# Patient Record
Sex: MALE | Race: WHITE | NOT HISPANIC OR LATINO
[De-identification: names, ages, dates, MRNs, and addresses within clinical notes are randomized per-mention and may not be internally consistent; named-entity substitution may affect disease eponyms.]

---

## 2018-12-07 PROBLEM — Z00.00 ENCOUNTER FOR PREVENTIVE HEALTH EXAMINATION: Status: ACTIVE | Noted: 2018-12-07

## 2018-12-13 ENCOUNTER — APPOINTMENT (OUTPATIENT)
Dept: NEUROLOGY | Facility: CLINIC | Age: 68
End: 2018-12-13
Payer: MEDICARE

## 2018-12-13 PROCEDURE — 95819 EEG AWAKE AND ASLEEP: CPT

## 2018-12-27 ENCOUNTER — APPOINTMENT (OUTPATIENT)
Dept: NEUROSURGERY | Facility: CLINIC | Age: 68
End: 2018-12-27
Payer: MEDICARE

## 2018-12-27 VITALS
HEIGHT: 69 IN | OXYGEN SATURATION: 95 % | RESPIRATION RATE: 16 BRPM | SYSTOLIC BLOOD PRESSURE: 167 MMHG | TEMPERATURE: 97.9 F | WEIGHT: 185 LBS | DIASTOLIC BLOOD PRESSURE: 95 MMHG | HEART RATE: 67 BPM | BODY MASS INDEX: 27.4 KG/M2

## 2018-12-27 DIAGNOSIS — R41.3 OTHER AMNESIA: ICD-10-CM

## 2018-12-27 DIAGNOSIS — G93.9 DISORDER OF BRAIN, UNSPECIFIED: ICD-10-CM

## 2018-12-27 PROCEDURE — 99205 OFFICE O/P NEW HI 60 MIN: CPT

## 2018-12-27 RX ORDER — TAMSULOSIN HYDROCHLORIDE 0.4 MG/1
0.4 CAPSULE ORAL
Refills: 0 | Status: ACTIVE | COMMUNITY

## 2018-12-27 RX ORDER — FINASTERIDE 5 MG/1
5 TABLET, FILM COATED ORAL
Refills: 0 | Status: ACTIVE | COMMUNITY

## 2018-12-27 RX ORDER — METOPROLOL TARTRATE 75 MG/1
TABLET, FILM COATED ORAL
Refills: 0 | Status: ACTIVE | COMMUNITY

## 2018-12-27 RX ORDER — ASPIRIN 81 MG
81 TABLET,CHEWABLE ORAL
Refills: 0 | Status: ACTIVE | COMMUNITY

## 2018-12-27 RX ORDER — ATORVASTATIN CALCIUM 80 MG/1
TABLET, FILM COATED ORAL
Refills: 0 | Status: ACTIVE | COMMUNITY

## 2019-01-02 ENCOUNTER — OUTPATIENT (OUTPATIENT)
Dept: OUTPATIENT SERVICES | Facility: HOSPITAL | Age: 69
LOS: 1 days | End: 2019-01-02

## 2019-01-02 ENCOUNTER — APPOINTMENT (OUTPATIENT)
Dept: MRI IMAGING | Facility: CLINIC | Age: 69
End: 2019-01-02

## 2021-04-02 ENCOUNTER — INPATIENT (INPATIENT)
Facility: HOSPITAL | Age: 71
LOS: 2 days | Discharge: HOME | DRG: 418 | End: 2021-04-05
Attending: SURGERY | Admitting: SURGERY
Payer: MEDICARE

## 2021-04-02 ENCOUNTER — TRANSCRIPTION ENCOUNTER (OUTPATIENT)
Age: 71
End: 2021-04-02

## 2021-04-02 VITALS
RESPIRATION RATE: 18 BRPM | OXYGEN SATURATION: 97 % | WEIGHT: 199.96 LBS | HEIGHT: 67 IN | TEMPERATURE: 98 F | SYSTOLIC BLOOD PRESSURE: 159 MMHG | HEART RATE: 86 BPM | DIASTOLIC BLOOD PRESSURE: 89 MMHG

## 2021-04-02 DIAGNOSIS — Z98.84 BARIATRIC SURGERY STATUS: Chronic | ICD-10-CM

## 2021-04-02 DIAGNOSIS — Z98.890 OTHER SPECIFIED POSTPROCEDURAL STATES: Chronic | ICD-10-CM

## 2021-04-02 LAB
ALBUMIN SERPL ELPH-MCNC: 4 G/DL — SIGNIFICANT CHANGE UP (ref 3.4–5)
ALP SERPL-CCNC: 80 U/L — SIGNIFICANT CHANGE UP (ref 40–120)
ALT FLD-CCNC: 50 U/L — HIGH (ref 12–42)
ANION GAP SERPL CALC-SCNC: 11 MMOL/L — SIGNIFICANT CHANGE UP (ref 9–16)
APPEARANCE UR: CLEAR — SIGNIFICANT CHANGE UP
APTT BLD: 30.6 SEC — SIGNIFICANT CHANGE UP (ref 27.5–35.5)
AST SERPL-CCNC: 31 U/L — SIGNIFICANT CHANGE UP (ref 15–37)
BACTERIA # UR AUTO: PRESENT /HPF
BASOPHILS # BLD AUTO: 0.04 K/UL — SIGNIFICANT CHANGE UP (ref 0–0.2)
BASOPHILS NFR BLD AUTO: 0.3 % — SIGNIFICANT CHANGE UP (ref 0–2)
BILIRUB SERPL-MCNC: 1.7 MG/DL — HIGH (ref 0.2–1.2)
BILIRUB UR-MCNC: ABNORMAL
BUN SERPL-MCNC: 17 MG/DL — SIGNIFICANT CHANGE UP (ref 7–23)
CALCIUM SERPL-MCNC: 9.3 MG/DL — SIGNIFICANT CHANGE UP (ref 8.5–10.5)
CHLORIDE SERPL-SCNC: 103 MMOL/L — SIGNIFICANT CHANGE UP (ref 96–108)
CO2 SERPL-SCNC: 27 MMOL/L — SIGNIFICANT CHANGE UP (ref 22–31)
COLOR SPEC: YELLOW — SIGNIFICANT CHANGE UP
COMMENT - URINE: SIGNIFICANT CHANGE UP
CREAT SERPL-MCNC: 1.02 MG/DL — SIGNIFICANT CHANGE UP (ref 0.5–1.3)
DIFF PNL FLD: ABNORMAL
EOSINOPHIL # BLD AUTO: 0.2 K/UL — SIGNIFICANT CHANGE UP (ref 0–0.5)
EOSINOPHIL NFR BLD AUTO: 1.3 % — SIGNIFICANT CHANGE UP (ref 0–6)
EPI CELLS # UR: SIGNIFICANT CHANGE UP /HPF (ref 0–5)
GLUCOSE SERPL-MCNC: 174 MG/DL — HIGH (ref 70–99)
GLUCOSE UR QL: NEGATIVE — SIGNIFICANT CHANGE UP
HCT VFR BLD CALC: 45.9 % — SIGNIFICANT CHANGE UP (ref 39–50)
HGB BLD-MCNC: 15.8 G/DL — SIGNIFICANT CHANGE UP (ref 13–17)
HYALINE CASTS # UR AUTO: SIGNIFICANT CHANGE UP /LPF (ref 0–2)
IMM GRANULOCYTES NFR BLD AUTO: 0.6 % — SIGNIFICANT CHANGE UP (ref 0–1.5)
INR BLD: 1.11 — SIGNIFICANT CHANGE UP (ref 0.88–1.16)
KETONES UR-MCNC: >=80 MG/DL
LEUKOCYTE ESTERASE UR-ACNC: NEGATIVE — SIGNIFICANT CHANGE UP
LIDOCAIN IGE QN: 72 U/L — LOW (ref 73–393)
LYMPHOCYTES # BLD AUTO: 0.42 K/UL — LOW (ref 1–3.3)
LYMPHOCYTES # BLD AUTO: 2.7 % — LOW (ref 13–44)
MCHC RBC-ENTMCNC: 31.7 PG — SIGNIFICANT CHANGE UP (ref 27–34)
MCHC RBC-ENTMCNC: 34.4 GM/DL — SIGNIFICANT CHANGE UP (ref 32–36)
MCV RBC AUTO: 92 FL — SIGNIFICANT CHANGE UP (ref 80–100)
MONOCYTES # BLD AUTO: 0.98 K/UL — HIGH (ref 0–0.9)
MONOCYTES NFR BLD AUTO: 6.3 % — SIGNIFICANT CHANGE UP (ref 2–14)
NEUTROPHILS # BLD AUTO: 13.94 K/UL — HIGH (ref 1.8–7.4)
NEUTROPHILS NFR BLD AUTO: 88.8 % — HIGH (ref 43–77)
NITRITE UR-MCNC: NEGATIVE — SIGNIFICANT CHANGE UP
NRBC # BLD: 0 /100 WBCS — SIGNIFICANT CHANGE UP (ref 0–0)
PH UR: 6 — SIGNIFICANT CHANGE UP (ref 5–8)
PLATELET # BLD AUTO: 183 K/UL — SIGNIFICANT CHANGE UP (ref 150–400)
POTASSIUM SERPL-MCNC: 3.8 MMOL/L — SIGNIFICANT CHANGE UP (ref 3.5–5.3)
POTASSIUM SERPL-SCNC: 3.8 MMOL/L — SIGNIFICANT CHANGE UP (ref 3.5–5.3)
PROT SERPL-MCNC: 7.9 G/DL — SIGNIFICANT CHANGE UP (ref 6.4–8.2)
PROT UR-MCNC: NEGATIVE MG/DL — SIGNIFICANT CHANGE UP
PROTHROM AB SERPL-ACNC: 13.2 SEC — SIGNIFICANT CHANGE UP (ref 10.6–13.6)
RBC # BLD: 4.99 M/UL — SIGNIFICANT CHANGE UP (ref 4.2–5.8)
RBC # FLD: 13.2 % — SIGNIFICANT CHANGE UP (ref 10.3–14.5)
RBC CASTS # UR COMP ASSIST: ABNORMAL /HPF
SARS-COV-2 RNA SPEC QL NAA+PROBE: SIGNIFICANT CHANGE UP
SODIUM SERPL-SCNC: 141 MMOL/L — SIGNIFICANT CHANGE UP (ref 132–145)
SP GR SPEC: >=1.03 — SIGNIFICANT CHANGE UP (ref 1–1.03)
UROBILINOGEN FLD QL: 0.2 E.U./DL — SIGNIFICANT CHANGE UP
WBC # BLD: 15.68 K/UL — HIGH (ref 3.8–10.5)
WBC # FLD AUTO: 15.68 K/UL — HIGH (ref 3.8–10.5)
WBC UR QL: < 5 /HPF — SIGNIFICANT CHANGE UP

## 2021-04-02 PROCEDURE — 71045 X-RAY EXAM CHEST 1 VIEW: CPT | Mod: 26

## 2021-04-02 PROCEDURE — 93010 ELECTROCARDIOGRAM REPORT: CPT

## 2021-04-02 PROCEDURE — 76700 US EXAM ABDOM COMPLETE: CPT | Mod: 26

## 2021-04-02 PROCEDURE — 74176 CT ABD & PELVIS W/O CONTRAST: CPT | Mod: 26,MH

## 2021-04-02 PROCEDURE — 99285 EMERGENCY DEPT VISIT HI MDM: CPT | Mod: CS

## 2021-04-02 RX ORDER — SODIUM CHLORIDE 9 MG/ML
1000 INJECTION INTRAMUSCULAR; INTRAVENOUS; SUBCUTANEOUS ONCE
Refills: 0 | Status: COMPLETED | OUTPATIENT
Start: 2021-04-02 | End: 2021-04-02

## 2021-04-02 RX ORDER — HEPARIN SODIUM 5000 [USP'U]/ML
5000 INJECTION INTRAVENOUS; SUBCUTANEOUS EVERY 8 HOURS
Refills: 0 | Status: DISCONTINUED | OUTPATIENT
Start: 2021-04-02 | End: 2021-04-05

## 2021-04-02 RX ORDER — ACETAMINOPHEN 500 MG
1000 TABLET ORAL ONCE
Refills: 0 | Status: COMPLETED | OUTPATIENT
Start: 2021-04-02 | End: 2021-04-03

## 2021-04-02 RX ORDER — MORPHINE SULFATE 50 MG/1
4 CAPSULE, EXTENDED RELEASE ORAL ONCE
Refills: 0 | Status: DISCONTINUED | OUTPATIENT
Start: 2021-04-02 | End: 2021-04-02

## 2021-04-02 RX ORDER — ONDANSETRON 8 MG/1
4 TABLET, FILM COATED ORAL EVERY 6 HOURS
Refills: 0 | Status: DISCONTINUED | OUTPATIENT
Start: 2021-04-02 | End: 2021-04-05

## 2021-04-02 RX ORDER — HYDROMORPHONE HYDROCHLORIDE 2 MG/ML
0.5 INJECTION INTRAMUSCULAR; INTRAVENOUS; SUBCUTANEOUS EVERY 4 HOURS
Refills: 0 | Status: DISCONTINUED | OUTPATIENT
Start: 2021-04-02 | End: 2021-04-03

## 2021-04-02 RX ORDER — ACETAMINOPHEN 500 MG
1000 TABLET ORAL ONCE
Refills: 0 | Status: DISCONTINUED | OUTPATIENT
Start: 2021-04-02 | End: 2021-04-02

## 2021-04-02 RX ORDER — ASPIRIN/CALCIUM CARB/MAGNESIUM 324 MG
81 TABLET ORAL DAILY
Refills: 0 | Status: DISCONTINUED | OUTPATIENT
Start: 2021-04-02 | End: 2021-04-05

## 2021-04-02 RX ORDER — SODIUM CHLORIDE 9 MG/ML
1000 INJECTION, SOLUTION INTRAVENOUS
Refills: 0 | Status: DISCONTINUED | OUTPATIENT
Start: 2021-04-02 | End: 2021-04-03

## 2021-04-02 RX ORDER — ONDANSETRON 8 MG/1
4 TABLET, FILM COATED ORAL ONCE
Refills: 0 | Status: COMPLETED | OUTPATIENT
Start: 2021-04-02 | End: 2021-04-02

## 2021-04-02 RX ORDER — CEFTRIAXONE 500 MG/1
1000 INJECTION, POWDER, FOR SOLUTION INTRAMUSCULAR; INTRAVENOUS ONCE
Refills: 0 | Status: COMPLETED | OUTPATIENT
Start: 2021-04-02 | End: 2021-04-02

## 2021-04-02 RX ORDER — METRONIDAZOLE 500 MG
TABLET ORAL
Refills: 0 | Status: DISCONTINUED | OUTPATIENT
Start: 2021-04-02 | End: 2021-04-05

## 2021-04-02 RX ORDER — CEFTRIAXONE 500 MG/1
1000 INJECTION, POWDER, FOR SOLUTION INTRAMUSCULAR; INTRAVENOUS EVERY 24 HOURS
Refills: 0 | Status: DISCONTINUED | OUTPATIENT
Start: 2021-04-02 | End: 2021-04-05

## 2021-04-02 RX ORDER — FINASTERIDE 5 MG/1
1 TABLET, FILM COATED ORAL
Qty: 0 | Refills: 0 | DISCHARGE

## 2021-04-02 RX ORDER — TAMSULOSIN HYDROCHLORIDE 0.4 MG/1
1 CAPSULE ORAL
Qty: 0 | Refills: 0 | DISCHARGE

## 2021-04-02 RX ORDER — METRONIDAZOLE 500 MG
500 TABLET ORAL ONCE
Refills: 0 | Status: COMPLETED | OUTPATIENT
Start: 2021-04-02 | End: 2021-04-02

## 2021-04-02 RX ORDER — METRONIDAZOLE 500 MG
500 TABLET ORAL EVERY 8 HOURS
Refills: 0 | Status: DISCONTINUED | OUTPATIENT
Start: 2021-04-03 | End: 2021-04-05

## 2021-04-02 RX ORDER — FAMOTIDINE 10 MG/ML
1 INJECTION INTRAVENOUS
Qty: 0 | Refills: 0 | DISCHARGE

## 2021-04-02 RX ORDER — ACETAMINOPHEN 500 MG
975 TABLET ORAL ONCE
Refills: 0 | Status: COMPLETED | OUTPATIENT
Start: 2021-04-02 | End: 2021-04-02

## 2021-04-02 RX ORDER — ATORVASTATIN CALCIUM 80 MG/1
1 TABLET, FILM COATED ORAL
Qty: 0 | Refills: 0 | DISCHARGE

## 2021-04-02 RX ADMIN — Medication 81 MILLIGRAM(S): at 21:48

## 2021-04-02 RX ADMIN — SODIUM CHLORIDE 1000 MILLILITER(S): 9 INJECTION INTRAMUSCULAR; INTRAVENOUS; SUBCUTANEOUS at 14:51

## 2021-04-02 RX ADMIN — MORPHINE SULFATE 4 MILLIGRAM(S): 50 CAPSULE, EXTENDED RELEASE ORAL at 13:04

## 2021-04-02 RX ADMIN — CEFTRIAXONE 100 MILLIGRAM(S): 500 INJECTION, POWDER, FOR SOLUTION INTRAMUSCULAR; INTRAVENOUS at 22:47

## 2021-04-02 RX ADMIN — HEPARIN SODIUM 5000 UNIT(S): 5000 INJECTION INTRAVENOUS; SUBCUTANEOUS at 21:48

## 2021-04-02 RX ADMIN — HYDROMORPHONE HYDROCHLORIDE 0.5 MILLIGRAM(S): 2 INJECTION INTRAMUSCULAR; INTRAVENOUS; SUBCUTANEOUS at 21:48

## 2021-04-02 RX ADMIN — ONDANSETRON 4 MILLIGRAM(S): 8 TABLET, FILM COATED ORAL at 13:04

## 2021-04-02 RX ADMIN — SODIUM CHLORIDE 1000 MILLILITER(S): 9 INJECTION INTRAMUSCULAR; INTRAVENOUS; SUBCUTANEOUS at 15:01

## 2021-04-02 RX ADMIN — CEFTRIAXONE 100 MILLIGRAM(S): 500 INJECTION, POWDER, FOR SOLUTION INTRAMUSCULAR; INTRAVENOUS at 15:01

## 2021-04-02 RX ADMIN — HYDROMORPHONE HYDROCHLORIDE 0.5 MILLIGRAM(S): 2 INJECTION INTRAMUSCULAR; INTRAVENOUS; SUBCUTANEOUS at 22:00

## 2021-04-02 RX ADMIN — Medication 975 MILLIGRAM(S): at 15:37

## 2021-04-02 RX ADMIN — MORPHINE SULFATE 4 MILLIGRAM(S): 50 CAPSULE, EXTENDED RELEASE ORAL at 15:35

## 2021-04-02 RX ADMIN — Medication 100 MILLIGRAM(S): at 21:24

## 2021-04-02 RX ADMIN — Medication 975 MILLIGRAM(S): at 15:02

## 2021-04-02 RX ADMIN — MORPHINE SULFATE 4 MILLIGRAM(S): 50 CAPSULE, EXTENDED RELEASE ORAL at 13:38

## 2021-04-02 RX ADMIN — SODIUM CHLORIDE 130 MILLILITER(S): 9 INJECTION, SOLUTION INTRAVENOUS at 21:48

## 2021-04-02 NOTE — H&P ADULT - NSICDXPASTMEDICALHX_GEN_ALL_CORE_FT
PAST MEDICAL HISTORY:  BPH without urinary obstruction     Chronic stable angina     GERD (gastroesophageal reflux disease)     HLD (hyperlipidemia)

## 2021-04-02 NOTE — ED PROVIDER NOTE - PROGRESS NOTE DETAILS
RUQ work up done, added CT renal stone hunt for hematuria, imaging consistent w cholecystitis., pt spiked a fever while in ED, treated w antipyretics, started first dose of IV abxs. Case discussed w Dr Curtis from Sx service, pt to be admitted to Sx [facility]. Stable for [facility].

## 2021-04-02 NOTE — ED ADULT TRIAGE NOTE - CHIEF COMPLAINT QUOTE
pt. sent from  for further eval of RLQ pain which began about 2 am. Pt. denies any nausea, vomiting or diarrhea. Pt. presents pale in triage.

## 2021-04-02 NOTE — ED PROVIDER NOTE - OBJECTIVE STATEMENT
71 yo male pt, hx of HLD (lipitor), BPH, presents w sudden onset R upper abd pain that started around 2 am this am. Became constant, had one moment of nausea when pain started that since has subsided. Went to  and sent in to ED for abd pain work up. no no chest pain, no vomiting, no fever, no chills, no cough, no HA, no neck pain, no rash.

## 2021-04-02 NOTE — ED PROVIDER NOTE - CLINICAL SUMMARY MEDICAL DECISION MAKING FREE TEXT BOX
Pt with RUQ pain, tender, +silva's, will get labs R/O wagner, pancreatitis, etc. Will provide analgesia and reassess.

## 2021-04-02 NOTE — H&P ADULT - NSHPSOCIALHISTORY_GEN_ALL_CORE
Marital Status:  Single      Recent Travel: No recent travel  Substance Use (street drugs): ( x ) never used    Tobacco Usage:     (  x ) former smoker - stopped 30 years ago  Alcohol Usage: occasional/social

## 2021-04-02 NOTE — H&P ADULT - NSHPLABSRESULTS_GEN_ALL_CORE
LABS:  cret                        15.8   15.68 )-----------( 183      ( 02 Apr 2021 13:11 )             45.9     04-02    141  |  103  |  17  ----------------------------<  174<H>  3.8   |  27  |  1.02    Ca    9.3      02 Apr 2021 13:11    TPro  7.9  /  Alb  4.0  /  TBili  1.7<H>  /  DBili  x   /  AST  31  /  ALT  50<H>  /  AlkPhos  80  04-02    PT/INR - ( 02 Apr 2021 13:12 )   PT: 13.2 sec;   INR: 1.11          PTT - ( 02 Apr 2021 13:12 )  PTT:30.6 sec      CT SCAN:   - Images of the upper abdomen demonstrate no focal hepatic abnormalities. Multiple small gallstones are seen within the gallbladder lumen and gallbladder neck. Gallbladder is mildly distended and there is mild infiltration of the fat surrounding the gallbladder. The pancreas is normal in appearance. No splenic abnormalities are seen.  - Gastric band is in place. Evaluation of bowel is limited without oral contrast. A normal appendix is identified. There is colonic diverticulosis without evidence of acute diverticulitis. There is no bowel obstruction or free air. Surgical clips are noted in the right lower quadrant. No ascites is evident. There is a small fat-containing ventral hernia at the entrance site of gastric band tubing.    IMPRESSION:  No hydronephrosis or nephrolithiasis.  Cholelithiasis with distended gallbladder and mild pericholecystic infiltration, suspicious for acute cholecystitis    US:   Liver: Within normal limits.  Bile ducts: Normal caliber. Common bile duct measures 0.5 cm.  Gallbladder: Multiple small gallstones. No pericholecystic fluid is present. There is gallbladder wall thickening to 6 mm. Sonographic Booth's sign is unable to be assessed secondary to recently administered analgesia.  Pancreas: Visualized portions are within normal limits.  Spleen: Measures 12.1 cm in length. Within normal limits.  Right kidney: Measures 10.8 cm in length. No hydronephrosis.  Left kidney: Measures 12.2 cm in length. No hydronephrosis.  Ascites: None.  Aorta and IVC: Visualized portions are within normal limits.    IMPRESSION:  Multiple small gallstones within a thick-walled gallbladder, which is suspicious for acute cholecystitis.

## 2021-04-02 NOTE — H&P ADULT - ATTENDING COMMENTS
Patient seen and examined in pre-op holding area on 4/3/2021.  Agree with above.    Abdomen soft, mild RUQ tenderness despite pain medication.  Prior scars from lap band and abdominoplasty noted.    Labs, imaging reviewed, consistent with acute cholecystitis.    To OR for robotic-assisted cholecystectomy.

## 2021-04-02 NOTE — H&P ADULT - NSHPREVIEWOFSYSTEMS_GEN_ALL_CORE
Review of Systems: Constitutional: (-) fever, (-) chills  Eyes/ENT: (-) blurry vision, (-) epistaxis, (-) sore throat  Cardiovascular: (-) chest pain, (-) syncope  Respiratory: (-) cough, (-) shortness of breath  Gastrointestinal: (+) abdominal pain, (-) vomiting, (-) diarrhea  Musculoskeletal: (-) neck pain, (-) back pain, (-) joint pain  Integumentary: (-) rash, (-) edema  Neurological: (-) headache, (-) altered mental status  Psychiatric: (-) hallucinations,   Allergic/Immunologic: (-) pruritus

## 2021-04-02 NOTE — H&P ADULT - HISTORY OF PRESENT ILLNESS
Patient is a 69 YO male, with PMH of BPH, HLD, Angina (s/p cardiac stent in 2013), GERD, former smoker. PSH of Lap band for weight loss (2005) and Abdominoplasty in 2007. Patient presents with 18 hours of severe RUQ pain, sudden, non-radiating, sharp, 8/10, worse with movement and improvement with Advil. The pain is not associated with food. The pain last for a short period. Denies Nausea, vomit, fever, chills, change in the color of stool or urine, yellow skin or eyes discoloration. This is the first time this pattern of pain happened. Last BM was 4/1/21. The pain started around 2am and he looked for medical assistance around noon because the pain did not improve. At The ED, he got morphine twice that significantly improved the pain and discomfort.

## 2021-04-02 NOTE — H&P ADULT - NSHPPHYSICALEXAM_GEN_ALL_CORE
.  VITAL SIGNS:      PHYSICAL EXAM:    Constitutional: WDWN resting comfortably in bed; NAD  HEENT: NC/AT, PERRL, EOMI, anicteric sclera, no nasal discharge; uvula midline, no oropharyngeal erythema or exudates; MMM  Neck: supple; no JVD or thyromegaly  Respiratory: unlabored breathing, no retractions or use of accessory muscles   Gastrointestinal: soft, NT, positive silva sign, no guarding  Extremities: WWP, no clubbing or cyanosis; no peripheral edema  Vascular: 2+ radial, DP/PT pulses B/L  Dermatologic: skin warm, dry and intact; no rashes, wounds, or scars, No ictericia   Lymphatic: no submandibular or cervical LAD  Neurologic: AAOx3; no focal deficits  Psychiatric: affect and characteristics of appearance, verbalizations, behaviors are appropriate

## 2021-04-02 NOTE — H&P ADULT - ASSESSMENT
Patient is a 71 YO male, with PMH of BPH, HLD, Angina (s/p cardiac stent in 2013), GERD, former smoker. PSH of Lap band for weight loss (2005) and Abdominoplasty in 2007. Presentes with RUQ pain, With clinical and radiologic diagnosis of acute cholecystitis. With Normal CBD size, normal bilirubin, and alkaline phosphate. US: Cholelithiasis with distended gallbladder and mild pericholecystic infiltration, suspicious for acute cholecystitis. Patient admitted for Cholecystectomy.     NPO after MN  Ceftriaxone and flagyl   AM labs   DVT PPX with SQH  EKG  ECH  IVF  Added to OR for robotic assisted Lap Ya with Dr. Seo

## 2021-04-03 ENCOUNTER — RESULT REVIEW (OUTPATIENT)
Age: 71
End: 2021-04-03

## 2021-04-03 LAB
ALBUMIN SERPL ELPH-MCNC: 3.3 G/DL — SIGNIFICANT CHANGE UP (ref 3.3–5)
ALP SERPL-CCNC: 126 U/L — HIGH (ref 40–120)
ALT FLD-CCNC: 191 U/L — HIGH (ref 10–45)
ANION GAP SERPL CALC-SCNC: 10 MMOL/L — SIGNIFICANT CHANGE UP (ref 5–17)
AST SERPL-CCNC: 148 U/L — HIGH (ref 10–40)
BILIRUB SERPL-MCNC: 4.8 MG/DL — HIGH (ref 0.2–1.2)
BLD GP AB SCN SERPL QL: NEGATIVE — SIGNIFICANT CHANGE UP
BUN SERPL-MCNC: 12 MG/DL — SIGNIFICANT CHANGE UP (ref 7–23)
CALCIUM SERPL-MCNC: 8.8 MG/DL — SIGNIFICANT CHANGE UP (ref 8.4–10.5)
CHLORIDE SERPL-SCNC: 105 MMOL/L — SIGNIFICANT CHANGE UP (ref 96–108)
CO2 SERPL-SCNC: 21 MMOL/L — LOW (ref 22–31)
COVID-19 SPIKE DOMAIN AB INTERP: POSITIVE
COVID-19 SPIKE DOMAIN ANTIBODY RESULT: >250 U/ML — HIGH
CREAT SERPL-MCNC: 0.84 MG/DL — SIGNIFICANT CHANGE UP (ref 0.5–1.3)
GLUCOSE SERPL-MCNC: 113 MG/DL — HIGH (ref 70–99)
HCT VFR BLD CALC: 43 % — SIGNIFICANT CHANGE UP (ref 39–50)
HCV AB S/CO SERPL IA: 0.06 S/CO — SIGNIFICANT CHANGE UP
HCV AB SERPL-IMP: SIGNIFICANT CHANGE UP
HGB BLD-MCNC: 14.5 G/DL — SIGNIFICANT CHANGE UP (ref 13–17)
MAGNESIUM SERPL-MCNC: 1.7 MG/DL — SIGNIFICANT CHANGE UP (ref 1.6–2.6)
MCHC RBC-ENTMCNC: 30.9 PG — SIGNIFICANT CHANGE UP (ref 27–34)
MCHC RBC-ENTMCNC: 33.7 GM/DL — SIGNIFICANT CHANGE UP (ref 32–36)
MCV RBC AUTO: 91.5 FL — SIGNIFICANT CHANGE UP (ref 80–100)
NRBC # BLD: 0 /100 WBCS — SIGNIFICANT CHANGE UP (ref 0–0)
PHOSPHATE SERPL-MCNC: 1.9 MG/DL — LOW (ref 2.5–4.5)
PLATELET # BLD AUTO: 172 K/UL — SIGNIFICANT CHANGE UP (ref 150–400)
POTASSIUM SERPL-MCNC: 3.5 MMOL/L — SIGNIFICANT CHANGE UP (ref 3.5–5.3)
POTASSIUM SERPL-SCNC: 3.5 MMOL/L — SIGNIFICANT CHANGE UP (ref 3.5–5.3)
PROT SERPL-MCNC: 6.4 G/DL — SIGNIFICANT CHANGE UP (ref 6–8.3)
RBC # BLD: 4.7 M/UL — SIGNIFICANT CHANGE UP (ref 4.2–5.8)
RBC # FLD: 13.5 % — SIGNIFICANT CHANGE UP (ref 10.3–14.5)
RH IG SCN BLD-IMP: POSITIVE — SIGNIFICANT CHANGE UP
SARS-COV-2 IGG+IGM SERPL QL IA: >250 U/ML — HIGH
SARS-COV-2 IGG+IGM SERPL QL IA: POSITIVE
SODIUM SERPL-SCNC: 136 MMOL/L — SIGNIFICANT CHANGE UP (ref 135–145)
WBC # BLD: 15.55 K/UL — HIGH (ref 3.8–10.5)
WBC # FLD AUTO: 15.55 K/UL — HIGH (ref 3.8–10.5)

## 2021-04-03 PROCEDURE — 47562 LAPAROSCOPIC CHOLECYSTECTOMY: CPT

## 2021-04-03 PROCEDURE — 88304 TISSUE EXAM BY PATHOLOGIST: CPT | Mod: 26

## 2021-04-03 PROCEDURE — 99223 1ST HOSP IP/OBS HIGH 75: CPT | Mod: 57

## 2021-04-03 RX ORDER — POTASSIUM PHOSPHATE, MONOBASIC POTASSIUM PHOSPHATE, DIBASIC 236; 224 MG/ML; MG/ML
30 INJECTION, SOLUTION INTRAVENOUS ONCE
Refills: 0 | Status: COMPLETED | OUTPATIENT
Start: 2021-04-03 | End: 2021-04-03

## 2021-04-03 RX ORDER — MAGNESIUM SULFATE 500 MG/ML
1 VIAL (ML) INJECTION ONCE
Refills: 0 | Status: COMPLETED | OUTPATIENT
Start: 2021-04-03 | End: 2021-04-03

## 2021-04-03 RX ORDER — METOPROLOL TARTRATE 50 MG
50 TABLET ORAL DAILY
Refills: 0 | Status: DISCONTINUED | OUTPATIENT
Start: 2021-04-03 | End: 2021-04-05

## 2021-04-03 RX ORDER — ACETAMINOPHEN 500 MG
650 TABLET ORAL EVERY 6 HOURS
Refills: 0 | Status: DISCONTINUED | OUTPATIENT
Start: 2021-04-03 | End: 2021-04-05

## 2021-04-03 RX ORDER — BUPIVACAINE 13.3 MG/ML
20 INJECTION, SUSPENSION, LIPOSOMAL INFILTRATION ONCE
Refills: 0 | Status: DISCONTINUED | OUTPATIENT
Start: 2021-04-03 | End: 2021-04-05

## 2021-04-03 RX ORDER — OXYCODONE HYDROCHLORIDE 5 MG/1
10 TABLET ORAL EVERY 6 HOURS
Refills: 0 | Status: DISCONTINUED | OUTPATIENT
Start: 2021-04-03 | End: 2021-04-05

## 2021-04-03 RX ORDER — PANTOPRAZOLE SODIUM 20 MG/1
40 TABLET, DELAYED RELEASE ORAL DAILY
Refills: 0 | Status: DISCONTINUED | OUTPATIENT
Start: 2021-04-03 | End: 2021-04-04

## 2021-04-03 RX ORDER — POTASSIUM CHLORIDE 20 MEQ
40 PACKET (EA) ORAL ONCE
Refills: 0 | Status: COMPLETED | OUTPATIENT
Start: 2021-04-03 | End: 2021-04-03

## 2021-04-03 RX ORDER — PANTOPRAZOLE SODIUM 20 MG/1
40 TABLET, DELAYED RELEASE ORAL ONCE
Refills: 0 | Status: COMPLETED | OUTPATIENT
Start: 2021-04-03 | End: 2021-04-03

## 2021-04-03 RX ORDER — HYDROMORPHONE HYDROCHLORIDE 2 MG/ML
0.5 INJECTION INTRAMUSCULAR; INTRAVENOUS; SUBCUTANEOUS
Refills: 0 | Status: DISCONTINUED | OUTPATIENT
Start: 2021-04-03 | End: 2021-04-05

## 2021-04-03 RX ORDER — OXYCODONE HYDROCHLORIDE 5 MG/1
5 TABLET ORAL EVERY 6 HOURS
Refills: 0 | Status: DISCONTINUED | OUTPATIENT
Start: 2021-04-03 | End: 2021-04-05

## 2021-04-03 RX ADMIN — HEPARIN SODIUM 5000 UNIT(S): 5000 INJECTION INTRAVENOUS; SUBCUTANEOUS at 22:17

## 2021-04-03 RX ADMIN — HEPARIN SODIUM 5000 UNIT(S): 5000 INJECTION INTRAVENOUS; SUBCUTANEOUS at 05:46

## 2021-04-03 RX ADMIN — Medication 100 MILLIGRAM(S): at 05:46

## 2021-04-03 RX ADMIN — Medication 100 GRAM(S): at 17:37

## 2021-04-03 RX ADMIN — Medication 100 MILLIGRAM(S): at 15:53

## 2021-04-03 RX ADMIN — PANTOPRAZOLE SODIUM 40 MILLIGRAM(S): 20 TABLET, DELAYED RELEASE ORAL at 05:46

## 2021-04-03 RX ADMIN — PANTOPRAZOLE SODIUM 40 MILLIGRAM(S): 20 TABLET, DELAYED RELEASE ORAL at 12:02

## 2021-04-03 RX ADMIN — POTASSIUM PHOSPHATE, MONOBASIC POTASSIUM PHOSPHATE, DIBASIC 83.33 MILLIMOLE(S): 236; 224 INJECTION, SOLUTION INTRAVENOUS at 18:30

## 2021-04-03 RX ADMIN — Medication 40 MILLIEQUIVALENT(S): at 15:53

## 2021-04-03 RX ADMIN — Medication 100 MILLIGRAM(S): at 23:24

## 2021-04-03 RX ADMIN — CEFTRIAXONE 100 MILLIGRAM(S): 500 INJECTION, POWDER, FOR SOLUTION INTRAMUSCULAR; INTRAVENOUS at 22:18

## 2021-04-03 RX ADMIN — Medication 1000 MILLIGRAM(S): at 03:45

## 2021-04-03 RX ADMIN — Medication 400 MILLIGRAM(S): at 03:29

## 2021-04-03 RX ADMIN — Medication 81 MILLIGRAM(S): at 15:54

## 2021-04-03 RX ADMIN — HEPARIN SODIUM 5000 UNIT(S): 5000 INJECTION INTRAVENOUS; SUBCUTANEOUS at 15:53

## 2021-04-03 NOTE — BRIEF OPERATIVE NOTE - OPERATION/FINDINGS
Abdomen entered via optiview port placement in LUQ given patient's lap band. 3 additional robotic ports place under direct view. Upon entering the abdomen, bilious fluid encountered in the RUQ which was suctioned. Gallbladder was gangrenous and was drained prior to dissection. GB fundus retracted superiorly over dome of liver. Dense adhesions between the GB & omentum/duo taken down. GB infundibulum retracted laterally towards RLQ exposing Calot’s triangle. Critical view of safety obtained. Cystic duct and artery clipped and divided. Peritoneal attachments btw GB & liver bed . Copious irrigation of the remaining bile in the RUQ and JPx1 placed near the GB fossa. Hemostasis achieved. Robot undocked and ports closed with monocryl.

## 2021-04-03 NOTE — PACU DISCHARGE NOTE - COMMENTS
Verbal report given to YOUNG Holman, v/anthony domingo, pt. to be transferred to room 9616, vpoicing no complaints.

## 2021-04-04 DIAGNOSIS — E78.5 HYPERLIPIDEMIA, UNSPECIFIED: ICD-10-CM

## 2021-04-04 DIAGNOSIS — I25.10 ATHEROSCLEROTIC HEART DISEASE OF NATIVE CORONARY ARTERY WITHOUT ANGINA PECTORIS: ICD-10-CM

## 2021-04-04 DIAGNOSIS — N40.0 BENIGN PROSTATIC HYPERPLASIA WITHOUT LOWER URINARY TRACT SYMPTOMS: ICD-10-CM

## 2021-04-04 DIAGNOSIS — K81.9 CHOLECYSTITIS, UNSPECIFIED: ICD-10-CM

## 2021-04-04 LAB
ALBUMIN SERPL ELPH-MCNC: 3.6 G/DL — SIGNIFICANT CHANGE UP (ref 3.3–5)
ALBUMIN SERPL ELPH-MCNC: 3.7 G/DL — SIGNIFICANT CHANGE UP (ref 3.3–5)
ALP SERPL-CCNC: 107 U/L — SIGNIFICANT CHANGE UP (ref 40–120)
ALP SERPL-CCNC: 109 U/L — SIGNIFICANT CHANGE UP (ref 40–120)
ALT FLD-CCNC: 172 U/L — HIGH (ref 10–45)
ALT FLD-CCNC: 173 U/L — HIGH (ref 10–45)
ANION GAP SERPL CALC-SCNC: 9 MMOL/L — SIGNIFICANT CHANGE UP (ref 5–17)
AST SERPL-CCNC: 106 U/L — HIGH (ref 10–40)
AST SERPL-CCNC: 115 U/L — HIGH (ref 10–40)
BASOPHILS # BLD AUTO: 0.01 K/UL — SIGNIFICANT CHANGE UP (ref 0–0.2)
BASOPHILS NFR BLD AUTO: 0.1 % — SIGNIFICANT CHANGE UP (ref 0–2)
BILIRUB DIRECT SERPL-MCNC: 0.6 MG/DL — HIGH (ref 0–0.2)
BILIRUB INDIRECT FLD-MCNC: 1 MG/DL — SIGNIFICANT CHANGE UP (ref 0.2–1)
BILIRUB SERPL-MCNC: 1.6 MG/DL — HIGH (ref 0.2–1.2)
BILIRUB SERPL-MCNC: 1.7 MG/DL — HIGH (ref 0.2–1.2)
BUN SERPL-MCNC: 13 MG/DL — SIGNIFICANT CHANGE UP (ref 7–23)
CALCIUM SERPL-MCNC: 9 MG/DL — SIGNIFICANT CHANGE UP (ref 8.4–10.5)
CHLORIDE SERPL-SCNC: 109 MMOL/L — HIGH (ref 96–108)
CO2 SERPL-SCNC: 23 MMOL/L — SIGNIFICANT CHANGE UP (ref 22–31)
CREAT SERPL-MCNC: 0.85 MG/DL — SIGNIFICANT CHANGE UP (ref 0.5–1.3)
EOSINOPHIL # BLD AUTO: 0 K/UL — SIGNIFICANT CHANGE UP (ref 0–0.5)
EOSINOPHIL NFR BLD AUTO: 0 % — SIGNIFICANT CHANGE UP (ref 0–6)
GLUCOSE SERPL-MCNC: 115 MG/DL — HIGH (ref 70–99)
HCT VFR BLD CALC: 41.3 % — SIGNIFICANT CHANGE UP (ref 39–50)
HGB BLD-MCNC: 13.7 G/DL — SIGNIFICANT CHANGE UP (ref 13–17)
IMM GRANULOCYTES NFR BLD AUTO: 0.6 % — SIGNIFICANT CHANGE UP (ref 0–1.5)
LYMPHOCYTES # BLD AUTO: 1.01 K/UL — SIGNIFICANT CHANGE UP (ref 1–3.3)
LYMPHOCYTES # BLD AUTO: 7.4 % — LOW (ref 13–44)
MAGNESIUM SERPL-MCNC: 2.2 MG/DL — SIGNIFICANT CHANGE UP (ref 1.6–2.6)
MCHC RBC-ENTMCNC: 30.9 PG — SIGNIFICANT CHANGE UP (ref 27–34)
MCHC RBC-ENTMCNC: 33.2 GM/DL — SIGNIFICANT CHANGE UP (ref 32–36)
MCV RBC AUTO: 93.2 FL — SIGNIFICANT CHANGE UP (ref 80–100)
MONOCYTES # BLD AUTO: 0.69 K/UL — SIGNIFICANT CHANGE UP (ref 0–0.9)
MONOCYTES NFR BLD AUTO: 5 % — SIGNIFICANT CHANGE UP (ref 2–14)
NEUTROPHILS # BLD AUTO: 11.91 K/UL — HIGH (ref 1.8–7.4)
NEUTROPHILS NFR BLD AUTO: 86.9 % — HIGH (ref 43–77)
NRBC # BLD: 0 /100 WBCS — SIGNIFICANT CHANGE UP (ref 0–0)
PHOSPHATE SERPL-MCNC: 1.5 MG/DL — LOW (ref 2.5–4.5)
PLATELET # BLD AUTO: 183 K/UL — SIGNIFICANT CHANGE UP (ref 150–400)
POTASSIUM SERPL-MCNC: 4.4 MMOL/L — SIGNIFICANT CHANGE UP (ref 3.5–5.3)
POTASSIUM SERPL-SCNC: 4.4 MMOL/L — SIGNIFICANT CHANGE UP (ref 3.5–5.3)
PROT SERPL-MCNC: 6.8 G/DL — SIGNIFICANT CHANGE UP (ref 6–8.3)
PROT SERPL-MCNC: 7 G/DL — SIGNIFICANT CHANGE UP (ref 6–8.3)
RBC # BLD: 4.43 M/UL — SIGNIFICANT CHANGE UP (ref 4.2–5.8)
RBC # FLD: 13.6 % — SIGNIFICANT CHANGE UP (ref 10.3–14.5)
SODIUM SERPL-SCNC: 141 MMOL/L — SIGNIFICANT CHANGE UP (ref 135–145)
WBC # BLD: 13.7 K/UL — HIGH (ref 3.8–10.5)
WBC # FLD AUTO: 13.7 K/UL — HIGH (ref 3.8–10.5)

## 2021-04-04 PROCEDURE — 99223 1ST HOSP IP/OBS HIGH 75: CPT

## 2021-04-04 RX ORDER — FAMOTIDINE 10 MG/ML
40 INJECTION INTRAVENOUS AT BEDTIME
Refills: 0 | Status: DISCONTINUED | OUTPATIENT
Start: 2021-04-04 | End: 2021-04-05

## 2021-04-04 RX ADMIN — HEPARIN SODIUM 5000 UNIT(S): 5000 INJECTION INTRAVENOUS; SUBCUTANEOUS at 15:27

## 2021-04-04 RX ADMIN — HEPARIN SODIUM 5000 UNIT(S): 5000 INJECTION INTRAVENOUS; SUBCUTANEOUS at 05:34

## 2021-04-04 RX ADMIN — Medication 81 MILLIGRAM(S): at 12:17

## 2021-04-04 RX ADMIN — Medication 50 MILLIGRAM(S): at 00:13

## 2021-04-04 RX ADMIN — Medication 50 MILLIGRAM(S): at 12:18

## 2021-04-04 RX ADMIN — Medication 64.25 MILLIMOLE(S): at 15:27

## 2021-04-04 RX ADMIN — Medication 100 MILLIGRAM(S): at 15:27

## 2021-04-04 RX ADMIN — FAMOTIDINE 40 MILLIGRAM(S): 10 INJECTION INTRAVENOUS at 22:03

## 2021-04-04 RX ADMIN — CEFTRIAXONE 100 MILLIGRAM(S): 500 INJECTION, POWDER, FOR SOLUTION INTRAMUSCULAR; INTRAVENOUS at 22:03

## 2021-04-04 RX ADMIN — PANTOPRAZOLE SODIUM 40 MILLIGRAM(S): 20 TABLET, DELAYED RELEASE ORAL at 12:18

## 2021-04-04 RX ADMIN — HEPARIN SODIUM 5000 UNIT(S): 5000 INJECTION INTRAVENOUS; SUBCUTANEOUS at 22:03

## 2021-04-04 RX ADMIN — Medication 100 MILLIGRAM(S): at 22:04

## 2021-04-04 RX ADMIN — Medication 100 MILLIGRAM(S): at 05:35

## 2021-04-04 NOTE — CONSULT NOTE ADULT - SUBJECTIVE AND OBJECTIVE BOX
Patient is a 70y old  Male who presents with a chief complaint of Acute Cholecystitis (04 Apr 2021 07:31)      HPI:  Patient is a 69 YO male, with PMH of BPH, HLD, Angina (s/p cardiac stent in 2013), GERD, former smoker. PSH of Lap band for weight loss (2005) and Abdominoplasty in 2007. Patient presents with 18 hours of severe RUQ pain, sudden, non-radiating, sharp, 8/10, worse with movement and improvement with Advil. The pain is not associated with food. The pain last for a short period. Denies Nausea, vomit, fever, chills, change in the color of stool or urine, yellow skin or eyes discoloration. This is the first time this pattern of pain happened. Last BM was 4/1/21. The pain started around 2am and he looked for medical assistance around noon because the pain did not improve. At The ED, he got morphine twice that significantly improved the pain and discomfort.  (02 Apr 2021 19:33)    Pt now s/p wagner. Pt today feels well. Wants to go home. Denies abdominal pain, N/V. Ambulating without assistance. ROS is otherwise negative.     Allergies    adhesives (Rash)  No Known Drug Allergies    Intolerances    Home meds: Lipitor 40 mg PO qHS, Toprol 50 mg PO qd XL, Flomax .4 mg PO qd, Finasteride 5 mg PO qd, ASA 81 mg PO qd    MEDICATIONS  (STANDING):  aspirin  chewable 81 milliGRAM(s) Oral daily  BUpivacaine liposome 1.3% Injectable (no eMAR) 20 milliLiter(s) Local Injection once  cefTRIAXone   IVPB 1000 milliGRAM(s) IV Intermittent every 24 hours  heparin   Injectable 5000 Unit(s) SubCutaneous every 8 hours  HYDROmorphone  Injectable 0.5 milliGRAM(s) IV Push every 30 minutes  metoprolol succinate ER 50 milliGRAM(s) Oral daily  metroNIDAZOLE  IVPB 500 milliGRAM(s) IV Intermittent every 8 hours  metroNIDAZOLE  IVPB      pantoprazole  Injectable 40 milliGRAM(s) IV Push daily  sodium phosphate IVPB 21 milliMole(s) IV Intermittent once    MEDICATIONS  (PRN):  acetaminophen   Tablet .. 650 milliGRAM(s) Oral every 6 hours PRN Temp greater or equal to 38C (100.4F), Mild Pain (1 - 3)  ondansetron Injectable 4 milliGRAM(s) IV Push every 6 hours PRN Nausea  oxyCODONE    IR 5 milliGRAM(s) Oral every 6 hours PRN Moderate Pain (4 - 6)  oxyCODONE    IR 10 milliGRAM(s) Oral every 6 hours PRN Severe Pain (7 - 10)      Drug Dosing Weight  Height (cm): 170.2 (03 Apr 2021 07:11)  Weight (kg): 90.7 (03 Apr 2021 07:11)  BMI (kg/m2): 31.3 (03 Apr 2021 07:11)  BSA (m2): 2.02 (03 Apr 2021 07:11)    PAST MEDICAL & SURGICAL HISTORY:  HLD (hyperlipidemia)    BPH without urinary obstruction    GERD (gastroesophageal reflux disease)    Chronic stable angina    LAP-BAND surgery status    S/P abdominoplasty        FAMILY HISTORY:  no cardiac history    SOCIAL HISTORY:  past smoker, social EtOH use    ADVANCE DIRECTIVES:    Vital Signs Last 24 Hrs  T(C): 36.3 (04 Apr 2021 12:43), Max: 36.8 (03 Apr 2021 23:28)  T(F): 97.4 (04 Apr 2021 12:43), Max: 98.3 (03 Apr 2021 23:28)  HR: 65 (04 Apr 2021 12:43) (65 - 78)  BP: 153/85 (04 Apr 2021 12:43) (134/82 - 176/98)  BP(mean): 118 (04 Apr 2021 05:00) (109 - 120)  ABP: --  ABP(mean): --  RR: 17 (04 Apr 2021 12:43) (17 - 18)  SpO2: 96% (04 Apr 2021 12:43) (94% - 96%)          I&O's Detail    03 Apr 2021 07:01  -  04 Apr 2021 07:00  --------------------------------------------------------  IN:    IV PiggyBack: 200 mL    IV PiggyBack: 750 mL    Lactated Ringers: 520 mL    Oral Fluid: 200 mL  Total IN: 1670 mL    OUT:    Bulb (mL): 83 mL    Voided (mL): 1500 mL  Total OUT: 1583 mL    Total NET: 87 mL      04 Apr 2021 07:01  -  04 Apr 2021 13:13  --------------------------------------------------------  IN:    Oral Fluid: 360 mL  Total IN: 360 mL    OUT:    Voided (mL): 550 mL  Total OUT: 550 mL    Total NET: -190 mL          PHYSICAL EXAM:      Constitutional: NAD  Eyes: PERRLA  ENMT: MMM  Neck: supple  Back: midline  Respiratory: CTA b/l  Cardiovascular: rrr, s1s2, no m/r/g  Gastrointestinal: soft, surgical incision, + drain  Extremities: wwp  Vascular: + 2pulses radial  Neurological: AAO x 4  Skin: no rash  Lymph Nodes: no LAD  Musculoskeletal: no joint swelling  Psychiatric: normal affect        LABS:  CBC Full  -  ( 04 Apr 2021 06:33 )  WBC Count : 13.70 K/uL  RBC Count : 4.43 M/uL  Hemoglobin : 13.7 g/dL  Hematocrit : 41.3 %  Platelet Count - Automated : 183 K/uL  Mean Cell Volume : 93.2 fl  Mean Cell Hemoglobin : 30.9 pg  Mean Cell Hemoglobin Concentration : 33.2 gm/dL  Auto Neutrophil # : 11.91 K/uL  Auto Lymphocyte # : 1.01 K/uL  Auto Monocyte # : 0.69 K/uL  Auto Eosinophil # : 0.00 K/uL  Auto Basophil # : 0.01 K/uL  Auto Neutrophil % : 86.9 %  Auto Lymphocyte % : 7.4 %  Auto Monocyte % : 5.0 %  Auto Eosinophil % : 0.0 %  Auto Basophil % : 0.1 %    04-04    141  |  109<H>  |  13  ----------------------------<  115<H>  4.4   |  23  |  0.85    Ca    9.0      04 Apr 2021 06:33  Phos  1.5     04-04  Mg     2.2     04-04    TPro  6.8  /  Alb  3.6  /  TBili  1.7<H>  /  DBili  0.6<H>  /  AST  106<H>  /  ALT  173<H>  /  AlkPhos  109  04-04    CAPILLARY BLOOD GLUCOSE              EKG:  NSR  ECHO, US:  < from: US Abdomen Complete (US Abdomen Complete .) (04.02.21 @ 14:01) >  IMPRESSION:  Multiple small gallstones within a thick-walled gallbladder, which is suspicious for acute cholecystitis.    < end of copied text >        RADIOLOGY:  < from: CT Renal Stone Hunt (04.02.21 @ 14:44) >  IMPRESSION:    No hydronephrosis or nephrolithiasis.    Cholelithiasis with distended gallbladder and mild pericholecystic infiltration, suspicious for acute cholecystitis    < end of copied text >

## 2021-04-04 NOTE — PROGRESS NOTE ADULT - ATTENDING COMMENTS
Patient seen and examined at bedside.  Noted some "sensitivity" after drinking liquids today.    Abdomen soft, incisions clean, dry, intact.  JENY serosanguinous.    Labs reviewed, LFTs trending down and likely due to severity of cholecystitis and bilious purulence noted in the RUQ on entry into the abdomen.    -Full liquid diet  -Ambulate as tolerated  -Continue antibiotics  -JENY to self-suction  -Possible discharge tomorrow if feeling improved and LFTs normalizing

## 2021-04-04 NOTE — CONSULT NOTE ADULT - ASSESSMENT
71 YO male, with PMH of BPH, HLD, Angina (s/p cardiac stent in 2013), GERD, former smoker. PSH of Lap band for weight loss (2005) and Abdominoplasty in 2007. Patient presents with 18 hours of severe RUQ pain, sudden, non-radiating, sharp, 8/10, worse with movement and improvement with Advil found to have acute cholecystitis now s/p wagner

## 2021-04-05 ENCOUNTER — TRANSCRIPTION ENCOUNTER (OUTPATIENT)
Age: 71
End: 2021-04-05

## 2021-04-05 VITALS
RESPIRATION RATE: 18 BRPM | TEMPERATURE: 98 F | DIASTOLIC BLOOD PRESSURE: 89 MMHG | SYSTOLIC BLOOD PRESSURE: 165 MMHG | OXYGEN SATURATION: 96 % | HEART RATE: 67 BPM

## 2021-04-05 LAB
ALBUMIN SERPL ELPH-MCNC: 3.3 G/DL — SIGNIFICANT CHANGE UP (ref 3.3–5)
ALP SERPL-CCNC: 119 U/L — SIGNIFICANT CHANGE UP (ref 40–120)
ALT FLD-CCNC: 121 U/L — HIGH (ref 10–45)
ANION GAP SERPL CALC-SCNC: 10 MMOL/L — SIGNIFICANT CHANGE UP (ref 5–17)
AST SERPL-CCNC: 69 U/L — HIGH (ref 10–40)
BILIRUB SERPL-MCNC: 1.2 MG/DL — SIGNIFICANT CHANGE UP (ref 0.2–1.2)
BUN SERPL-MCNC: 13 MG/DL — SIGNIFICANT CHANGE UP (ref 7–23)
CALCIUM SERPL-MCNC: 8.9 MG/DL — SIGNIFICANT CHANGE UP (ref 8.4–10.5)
CHLORIDE SERPL-SCNC: 107 MMOL/L — SIGNIFICANT CHANGE UP (ref 96–108)
CO2 SERPL-SCNC: 22 MMOL/L — SIGNIFICANT CHANGE UP (ref 22–31)
CREAT SERPL-MCNC: 0.84 MG/DL — SIGNIFICANT CHANGE UP (ref 0.5–1.3)
GLUCOSE SERPL-MCNC: 93 MG/DL — SIGNIFICANT CHANGE UP (ref 70–99)
HCT VFR BLD CALC: 43.8 % — SIGNIFICANT CHANGE UP (ref 39–50)
HGB BLD-MCNC: 14 G/DL — SIGNIFICANT CHANGE UP (ref 13–17)
MAGNESIUM SERPL-MCNC: 2.1 MG/DL — SIGNIFICANT CHANGE UP (ref 1.6–2.6)
MCHC RBC-ENTMCNC: 30.4 PG — SIGNIFICANT CHANGE UP (ref 27–34)
MCHC RBC-ENTMCNC: 32 GM/DL — SIGNIFICANT CHANGE UP (ref 32–36)
MCV RBC AUTO: 95.2 FL — SIGNIFICANT CHANGE UP (ref 80–100)
NRBC # BLD: 0 /100 WBCS — SIGNIFICANT CHANGE UP (ref 0–0)
PHOSPHATE SERPL-MCNC: 1.7 MG/DL — LOW (ref 2.5–4.5)
PLATELET # BLD AUTO: 182 K/UL — SIGNIFICANT CHANGE UP (ref 150–400)
POTASSIUM SERPL-MCNC: 4 MMOL/L — SIGNIFICANT CHANGE UP (ref 3.5–5.3)
POTASSIUM SERPL-SCNC: 4 MMOL/L — SIGNIFICANT CHANGE UP (ref 3.5–5.3)
PROT SERPL-MCNC: 6.7 G/DL — SIGNIFICANT CHANGE UP (ref 6–8.3)
RBC # BLD: 4.6 M/UL — SIGNIFICANT CHANGE UP (ref 4.2–5.8)
RBC # FLD: 13.8 % — SIGNIFICANT CHANGE UP (ref 10.3–14.5)
SODIUM SERPL-SCNC: 139 MMOL/L — SIGNIFICANT CHANGE UP (ref 135–145)
WBC # BLD: 8.88 K/UL — SIGNIFICANT CHANGE UP (ref 3.8–10.5)
WBC # FLD AUTO: 8.88 K/UL — SIGNIFICANT CHANGE UP (ref 3.8–10.5)

## 2021-04-05 PROCEDURE — 81001 URINALYSIS AUTO W/SCOPE: CPT

## 2021-04-05 PROCEDURE — 99285 EMERGENCY DEPT VISIT HI MDM: CPT | Mod: 25

## 2021-04-05 PROCEDURE — 86803 HEPATITIS C AB TEST: CPT

## 2021-04-05 PROCEDURE — 85025 COMPLETE CBC W/AUTO DIFF WBC: CPT

## 2021-04-05 PROCEDURE — 93005 ELECTROCARDIOGRAM TRACING: CPT

## 2021-04-05 PROCEDURE — 80076 HEPATIC FUNCTION PANEL: CPT

## 2021-04-05 PROCEDURE — 83690 ASSAY OF LIPASE: CPT

## 2021-04-05 PROCEDURE — 76700 US EXAM ABDOM COMPLETE: CPT

## 2021-04-05 PROCEDURE — 86850 RBC ANTIBODY SCREEN: CPT

## 2021-04-05 PROCEDURE — 36415 COLL VENOUS BLD VENIPUNCTURE: CPT

## 2021-04-05 PROCEDURE — 80053 COMPREHEN METABOLIC PANEL: CPT

## 2021-04-05 PROCEDURE — 86901 BLOOD TYPING SEROLOGIC RH(D): CPT

## 2021-04-05 PROCEDURE — 71045 X-RAY EXAM CHEST 1 VIEW: CPT

## 2021-04-05 PROCEDURE — 84100 ASSAY OF PHOSPHORUS: CPT

## 2021-04-05 PROCEDURE — 85730 THROMBOPLASTIN TIME PARTIAL: CPT

## 2021-04-05 PROCEDURE — 85610 PROTHROMBIN TIME: CPT

## 2021-04-05 PROCEDURE — S2900: CPT

## 2021-04-05 PROCEDURE — 83735 ASSAY OF MAGNESIUM: CPT

## 2021-04-05 PROCEDURE — 87635 SARS-COV-2 COVID-19 AMP PRB: CPT

## 2021-04-05 PROCEDURE — 74176 CT ABD & PELVIS W/O CONTRAST: CPT

## 2021-04-05 PROCEDURE — 86769 SARS-COV-2 COVID-19 ANTIBODY: CPT

## 2021-04-05 PROCEDURE — 86900 BLOOD TYPING SEROLOGIC ABO: CPT

## 2021-04-05 PROCEDURE — 85027 COMPLETE CBC AUTOMATED: CPT

## 2021-04-05 PROCEDURE — 88304 TISSUE EXAM BY PATHOLOGIST: CPT

## 2021-04-05 RX ORDER — METOPROLOL TARTRATE 50 MG
1 TABLET ORAL
Qty: 30 | Refills: 0
Start: 2021-04-05 | End: 2021-05-04

## 2021-04-05 RX ORDER — METOPROLOL TARTRATE 50 MG
1 TABLET ORAL
Qty: 0 | Refills: 0 | DISCHARGE

## 2021-04-05 RX ADMIN — Medication 64.25 MILLIMOLE(S): at 10:08

## 2021-04-05 RX ADMIN — Medication 100 MILLIGRAM(S): at 06:00

## 2021-04-05 RX ADMIN — HEPARIN SODIUM 5000 UNIT(S): 5000 INJECTION INTRAVENOUS; SUBCUTANEOUS at 06:00

## 2021-04-05 RX ADMIN — Medication 50 MILLIGRAM(S): at 05:51

## 2021-04-05 NOTE — PROGRESS NOTE ADULT - ASSESSMENT
69 YO male, with PMH of BPH, HLD, Angina (s/p cardiac stent in 2013), GERD, former smoker. PSH of Lap band for weight loss (2005) and Abdominoplasty in 2007. Presentes with RUQ pain, Now s/p lap wagner on 4/3, perf + gang.     CLD/IVF (HLIV when tolerating PO)   Pain/Nausea control  Ceftriaxone and flagyl   AM labs   SCD/SQH  JENY x1  
70M PMH BPH, CAD (s/p cardiac stent in 2013), GERD a/w acute cholecystitits, s/p laparoscopic cholecystectomy (4/3).    FLD  Pain/Nausea control  Ceftriaxone and flagyl   AM labs   SCD/SQH  JENY x1  Home beta blocker  
71 YO male, with PMH of BPH, HLD, Angina (s/p cardiac stent in 2013), GERD, former smoker. PSH of Lap band for weight loss (2005) and Abdominoplasty in 2007. Presentes with RUQ pain, With clinical and radiologic diagnosis of acute cholecystitis. With Normal CBD size, normal bilirubin, and alkaline phosphate. US: Cholelithiasis with distended gallbladder and mild pericholecystic infiltration, suspicious for acute cholecystitis.     NPO/IVF  Pain/Nausea control  Ceftriaxone and flagyl   AM labs   SCD/SQH  OR   
69 YO male, with PMH of BPH, HLD, Angina (s/p cardiac stent in 2013), GERD, former smoker. PSH of Lap band for weight loss (2005) and Abdominoplasty in 2007. Presentes with RUQ pain, Now s/p lap wagner on 4/3, perf + gang.     CLD  Pain/Nausea control  Ceftriaxone and flagyl   AM labs   SCD/SQH  JENY x1  Home BB

## 2021-04-05 NOTE — PROGRESS NOTE ADULT - SUBJECTIVE AND OBJECTIVE BOX
Pt doing well this AM. Asking about going home, but explained that we would like him to stay a bit longer. JENY w serous/ss output. Patient seen and examined bedside with chief resident.    cefTRIAXone   IVPB 1000 milliGRAM(s) IV Intermittent every 24 hours  metoprolol succinate ER 50 milliGRAM(s) Oral daily  metroNIDAZOLE  IVPB 500 milliGRAM(s) IV Intermittent every 8 hours  metroNIDAZOLE  IVPB          Vital Signs Last 24 Hrs  T(C): 36.7 (04 Apr 2021 05:00), Max: 36.8 (03 Apr 2021 23:28)  T(F): 98.1 (04 Apr 2021 05:00), Max: 98.3 (03 Apr 2021 23:28)  HR: 73 (04 Apr 2021 05:00) (73 - 89)  BP: 168/96 (04 Apr 2021 05:00) (120/63 - 176/98)  BP(mean): 118 (04 Apr 2021 05:00) (84 - 120)  RR: 18 (04 Apr 2021 05:00) (11 - 20)  SpO2: 95% (04 Apr 2021 05:00) (92% - 96%)    I&O's Detail    03 Apr 2021 07:01  -  04 Apr 2021 07:00  --------------------------------------------------------  IN:    IV PiggyBack: 200 mL    IV PiggyBack: 750 mL    Lactated Ringers: 520 mL    Oral Fluid: 200 mL  Total IN: 1670 mL    OUT:    Bulb (mL): 83 mL    Voided (mL): 1500 mL  Total OUT: 1583 mL    Total NET: 87 mL      Physical Exam  General: NAD, resting comfortably in bed  Pulm: Nonlabored breathing, no respiratory distress  Abd: soft, obese, mildly distended, mildly appropriately tender, incisions c/d/i, JENY w serous/ss output  Extrem: WWP, no edema,  Neuro: A/O x 3, CNs II-XII grossly intact, no focal deficits, normal sensation  Pulses: palpable distal pulses                                   13.7   13.70 )-----------( 183      ( 04 Apr 2021 06:33 )             41.3   04-04    141  |  109<H>  |  13  ----------------------------<  115<H>  4.4   |  23  |  0.85    Ca    9.0      04 Apr 2021 06:33  Phos  1.5     04-04  Mg     2.2     04-04    TPro  6.8  /  Alb  3.6  /  TBili  1.7<H>  /  DBili  x   /  AST  106<H>  /  ALT  173<H>  /  AlkPhos  109  04-04                
POST-OP DAY: 2 s/p JUAN edward     SUBJECTIVE:   Patient seen and examined bedside by chief resident  Julian HINES  Dentoo N/V  Pain well controlled   +BF  Voiding    aspirin  chewable 81 milliGRAM(s) Oral daily  cefTRIAXone   IVPB 1000 milliGRAM(s) IV Intermittent every 24 hours  heparin   Injectable 5000 Unit(s) SubCutaneous every 8 hours  metoprolol succinate ER 50 milliGRAM(s) Oral daily  metroNIDAZOLE  IVPB 500 milliGRAM(s) IV Intermittent every 8 hours  metroNIDAZOLE  IVPB        MEDICATIONS  (PRN):  acetaminophen   Tablet .. 650 milliGRAM(s) Oral every 6 hours PRN Temp greater or equal to 38C (100.4F), Mild Pain (1 - 3)  ondansetron Injectable 4 milliGRAM(s) IV Push every 6 hours PRN Nausea  oxyCODONE    IR 5 milliGRAM(s) Oral every 6 hours PRN Moderate Pain (4 - 6)  oxyCODONE    IR 10 milliGRAM(s) Oral every 6 hours PRN Severe Pain (7 - 10)      I&O's Detail    04 Apr 2021 07:01  -  05 Apr 2021 07:00  --------------------------------------------------------  IN:    IV PiggyBack: 300 mL    IV PiggyBack: 50 mL    Oral Fluid: 850 mL  Total IN: 1200 mL    OUT:    Bulb (mL): 70 mL    Voided (mL): 1550 mL  Total OUT: 1620 mL    Total NET: -420 mL          Vital Signs Last 24 Hrs  T(C): 36.7 (05 Apr 2021 05:00), Max: 36.9 (04 Apr 2021 17:36)  T(F): 98.1 (05 Apr 2021 05:00), Max: 98.5 (04 Apr 2021 17:36)  HR: 61 (05 Apr 2021 05:00) (61 - 66)  BP: 149/84 (05 Apr 2021 05:00) (140/87 - 153/85)  BP(mean): 106 (05 Apr 2021 05:00) (106 - 106)  RR: 17 (05 Apr 2021 05:00) (16 - 18)  SpO2: 92% (05 Apr 2021 05:00) (92% - 96%)    General: NAD, resting comfortably in bed  C/V: NSR  Pulm: Nonlabored breathing, no respiratory distress  Abd: soft, NT/ND, incisions C/D/G, JENY x1 Serous   Extrem: WWP, no edema, SCDs in place    LABS:                        14.0   8.88  )-----------( 182      ( 05 Apr 2021 06:46 )             43.8     04-05    139  |  107  |  13  ----------------------------<  93  4.0   |  22  |  0.84    Ca    8.9      05 Apr 2021 06:46  Phos  1.7     04-05  Mg     2.1     04-05    TPro  6.7  /  Alb  3.3  /  TBili  1.2  /  DBili  x   /  AST  69<H>  /  ALT  121<H>  /  AlkPhos  119  04-05          RADIOLOGY & ADDITIONAL STUDIES:    
POST-OPERATIVE NOTE    Procedure: RA Lap Cholecystectomy    Diagnosis/Indication: acute cholecystitis     Surgeon: Rayray    S: Pt has no complaints. Denies CP, SOB, COSME, calf tenderness. Pain controlled with medication. Denies nausea, vomiting.    O:  T(C): 36.7 (04-03-21 @ 12:29), Max: 36.7 (04-03-21 @ 12:29)  T(F): 98.1 (04-03-21 @ 12:29), Max: 98.1 (04-03-21 @ 12:29)  HR: 75 (04-03-21 @ 12:29) (73 - 89)  BP: 141/89 (04-03-21 @ 12:29) (120/63 - 141/89)  RR: 18 (04-03-21 @ 12:29) (11 - 20)  SpO2: 95% (04-03-21 @ 12:29) (92% - 96%)  Wt(kg): --                        14.5   15.55 )-----------( 172      ( 03 Apr 2021 07:59 )             43.0     04-03    136  |  105  |  12  ----------------------------<  113<H>  3.5   |  21<L>  |  0.84    Ca    8.8      03 Apr 2021 07:59  Phos  1.9     04-03  Mg     1.7     04-03    TPro  6.4  /  Alb  3.3  /  TBili  4.8<H>  /  DBili  x   /  AST  148<H>  /  ALT  191<H>  /  AlkPhos  126<H>  04-03      Gen: NAD, resting comfortably in bed  C/V: NSR  Pulm: Nonlabored breathing, no respiratory distress  Abd: soft, ND, incisions C/D/G garcia-incisional TTP. JENY x1 SS  Extrem: WWP, no calf edema or tenderness, SCDs in place          
SUBJECTIVE:   Patient seen and examined bedside by chief resident  Denies N/V  Pain improved   pending OR     aspirin  chewable 81 milliGRAM(s) Oral daily  cefTRIAXone   IVPB 1000 milliGRAM(s) IV Intermittent every 24 hours  heparin   Injectable 5000 Unit(s) SubCutaneous every 8 hours  metroNIDAZOLE  IVPB      metroNIDAZOLE  IVPB 500 milliGRAM(s) IV Intermittent every 8 hours    MEDICATIONS  (PRN):  HYDROmorphone  Injectable 0.5 milliGRAM(s) IV Push every 4 hours PRN Moderate Pain (4 - 6)  HYDROmorphone  Injectable 0.5 milliGRAM(s) IV Push every 4 hours PRN Severe Pain (7 - 10)  ondansetron Injectable 4 milliGRAM(s) IV Push every 6 hours PRN Nausea      I&O's Detail    02 Apr 2021 07:01  -  03 Apr 2021 07:00  --------------------------------------------------------  IN:    IV PiggyBack: 350 mL    Lactated Ringers: 1138 mL  Total IN: 1488 mL    OUT:    Voided (mL): 450 mL  Total OUT: 450 mL    Total NET: 1038 mL      03 Apr 2021 07:01  -  03 Apr 2021 09:15  --------------------------------------------------------  IN:  Total IN: 0 mL    OUT:    Voided (mL): 275 mL  Total OUT: 275 mL    Total NET: -275 mL          Vital Signs Last 24 Hrs  T(C): 37.3 (03 Apr 2021 05:02), Max: 38.1 (02 Apr 2021 14:55)  T(F): 99.2 (03 Apr 2021 05:02), Max: 100.6 (02 Apr 2021 14:55)  HR: 86 (03 Apr 2021 05:02) (80 - 101)  BP: 116/73 (03 Apr 2021 05:02) (116/73 - 189/95)  BP(mean): --  RR: 18 (03 Apr 2021 05:02) (15 - 19)  SpO2: 95% (03 Apr 2021 05:02) (94% - 97%)    General: NAD, resting comfortably in bed  C/V: NSR  Pulm: Nonlabored breathing, no respiratory distress  Abd: soft, obese, TTP in RUQ  Extrem: WWP, no edema, SCDs in place    LABS:                        14.5   15.55 )-----------( 172      ( 03 Apr 2021 07:59 )             43.0     04-03    136  |  105  |  12  ----------------------------<  113<H>  3.5   |  21<L>  |  0.84    Ca    8.8      03 Apr 2021 07:59  Phos  1.9     04-03  Mg     1.7     04-03    TPro  6.4  /  Alb  3.3  /  TBili  4.8<H>  /  DBili  x   /  AST  148<H>  /  ALT  191<H>  /  AlkPhos  126<H>  04-03    PT/INR - ( 02 Apr 2021 13:12 )   PT: 13.2 sec;   INR: 1.11          PTT - ( 02 Apr 2021 13:12 )  PTT:30.6 sec  Urinalysis Basic - ( 02 Apr 2021 13:12 )    Color: Yellow / Appearance: Clear / SG: >=1.030 / pH: x  Gluc: x / Ketone: >=80 mg/dL  / Bili: Small / Urobili: 0.2 E.U./dL   Blood: x / Protein: NEGATIVE mg/dL / Nitrite: NEGATIVE   Leuk Esterase: NEGATIVE / RBC: 5-10 /HPF / WBC < 5 /HPF   Sq Epi: x / Non Sq Epi: 0-5 /HPF / Bacteria: Present /HPF        RADIOLOGY & ADDITIONAL STUDIES:

## 2021-04-05 NOTE — DISCHARGE NOTE PROVIDER - NSDCFUADDINST_GEN_ALL_CORE_FT
General Discharge Instructions:  Please resume all regular home medications unless specifically advised not to take a particular medication. Also, please take any new medications as prescribed.  Please get plenty of rest, continue to ambulate several times per day, and drink adequate amounts of fluids. Avoid lifting weights greater than 5-10 lbs until you follow-up with your surgeon, who will instruct you further regarding activity restrictions.  Avoid driving or operating heavy machinery while taking pain medications.  Please follow-up with your surgeon and Primary Care Provider (PCP) as advised.  Incision Care:  *Please call your doctor or nurse practitioner if you have increased pain, swelling, redness, or drainage from the incision site.  *Avoid swimming and baths until your follow-up appointment.  *You may shower, and wash surgical incisions with a mild soap and warm water. Gently pat the area dry.  *If you have staples, they will be removed at your follow-up appointment.  *If you have steri-strips, they will fall off on their own. Please remove any remaining strips 7-10 days after surgery.  Warning Signs:  Please call your doctor or nurse practitioner if you experience the following:  *You experience new chest pain, pressure, squeezing or tightness.  *New or worsening cough, shortness of breath, or wheeze.  *If you are vomiting and cannot keep down fluids or your medications.  *You are getting dehydrated due to continued vomiting, diarrhea, or other reasons. Signs of dehydration include dry mouth, rapid heartbeat, or feeling dizzy or faint when standing.  *You see blood or dark/black material when you vomit or have a bowel movement.  *You experience burning when you urinate, have blood in your urine, or experience a discharge.  *Your pain is not improving within 8-12 hours or is not gone within 24 hours. Call or return immediately if your pain is getting worse, changes location, or moves to your chest or back.  *You have shaking chills, or fever greater than 101.5 degrees Fahrenheit or 38 degrees Celsius.  *Any change in your symptoms, or any new symptoms that concern you.

## 2021-04-05 NOTE — DISCHARGE NOTE NURSING/CASE MANAGEMENT/SOCIAL WORK - PATIENT PORTAL LINK FT
You can access the FollowMyHealth Patient Portal offered by [facility] by registering at the following website: [email or web address]. By joining [facility] [employer]’s FollowMyHealth portal, you will also be able to view your health information using other applications (apps) compatible with our system.

## 2021-04-05 NOTE — DISCHARGE NOTE PROVIDER - HOSPITAL COURSE
Patient is a 69 YO male, with PMH of BPH, HLD, Angina (s/p cardiac stent in 2013), GERD, former smoker. PSH of Lap band for weight loss (2005) and Abdominoplasty in 2007. Patient presented to ED with 18 hours of severe RUQ pain, sudden, non-radiating, sharp, 8/10, worse with movement and improvement with Advil. Denied Nausea, vomit, fever, chills, A RUQ U/S showed Multiple small gallstones within a thick-walled gallbladder,  suspicious for acute cholecystitis. Pt was transferred to [facility] and admitted to General Surgery for further management. At [facility], pt was made NPO and received IV antibiotics, IV fluids and adequate pain medication in anticipation of surgery. Pt remained afebrile and hemodynamically stable. Pt was taken to the OR for a RA laparoscopic cholecystectomy. Gallbladder was found to be perforated and gangrenous, a JENY drain was left in the RUQ.  The post-operative course was uneventful. Pt is now tolerating a diet without nausea/vomiting, ambulating without difficulty, and voiding spontaneously with bowel function. Pain is well controlled on oral pain medication. Pt has been deemed ready for discharge and will follow up with the surgeon.

## 2021-04-05 NOTE — DISCHARGE NOTE PROVIDER - NSDCCPCAREPLAN_GEN_ALL_CORE_FT
PRINCIPAL DISCHARGE DIAGNOSIS  Diagnosis: Cholecystitis  Assessment and Plan of Treatment: s/p RA Gaye wagner

## 2021-04-05 NOTE — DISCHARGE NOTE PROVIDER - CARE PROVIDER_API CALL
Laya Seo)  Surgery  [address] [place] [address]  Phone: [phone]  Fax: [phone]  Follow Up Time: 2 weeks

## 2021-04-05 NOTE — DISCHARGE NOTE PROVIDER - NSDCMRMEDTOKEN_GEN_ALL_CORE_FT
amoxicillin-clavulanate 875 mg-125 mg oral tablet: 1 tab(s) orally every 12 hours   famotidine 40 mg oral tablet: 1 tab(s) orally once a day (at bedtime)  finasteride 5 mg oral tablet: 1 tab(s) orally once a day  Flomax 0.4 mg oral capsule: 1 cap(s) orally once a day  Lipitor 40 mg oral tablet: 1 tab(s) orally once a day

## 2021-04-06 PROBLEM — N40.0 BENIGN PROSTATIC HYPERPLASIA WITHOUT LOWER URINARY TRACT SYMPTOMS: Chronic | Status: ACTIVE | Noted: 2021-04-02

## 2021-04-06 PROBLEM — I20.8 OTHER FORMS OF ANGINA PECTORIS: Chronic | Status: ACTIVE | Noted: 2021-04-02

## 2021-04-06 PROBLEM — E78.5 HYPERLIPIDEMIA, UNSPECIFIED: Chronic | Status: ACTIVE | Noted: 2021-04-02

## 2021-04-06 PROBLEM — K21.9 GASTRO-ESOPHAGEAL REFLUX DISEASE WITHOUT ESOPHAGITIS: Chronic | Status: ACTIVE | Noted: 2021-04-02

## 2021-04-06 LAB — SURGICAL PATHOLOGY STUDY: SIGNIFICANT CHANGE UP

## 2021-04-09 DIAGNOSIS — Z95.5 PRESENCE OF CORONARY ANGIOPLASTY IMPLANT AND GRAFT: ICD-10-CM

## 2021-04-09 DIAGNOSIS — E78.5 HYPERLIPIDEMIA, UNSPECIFIED: ICD-10-CM

## 2021-04-09 DIAGNOSIS — K81.0 ACUTE CHOLECYSTITIS: ICD-10-CM

## 2021-04-09 DIAGNOSIS — R10.9 UNSPECIFIED ABDOMINAL PAIN: ICD-10-CM

## 2021-04-09 DIAGNOSIS — I25.118 ATHEROSCLEROTIC HEART DISEASE OF NATIVE CORONARY ARTERY WITH OTHER FORMS OF ANGINA PECTORIS: ICD-10-CM

## 2021-04-09 DIAGNOSIS — N40.0 BENIGN PROSTATIC HYPERPLASIA WITHOUT LOWER URINARY TRACT SYMPTOMS: ICD-10-CM

## 2021-04-09 DIAGNOSIS — K21.9 GASTRO-ESOPHAGEAL REFLUX DISEASE WITHOUT ESOPHAGITIS: ICD-10-CM

## 2021-04-09 DIAGNOSIS — E66.9 OBESITY, UNSPECIFIED: ICD-10-CM

## 2021-04-09 DIAGNOSIS — Z87.891 PERSONAL HISTORY OF NICOTINE DEPENDENCE: ICD-10-CM

## 2021-04-09 DIAGNOSIS — R18.8 OTHER ASCITES: ICD-10-CM

## 2021-04-13 ENCOUNTER — APPOINTMENT (OUTPATIENT)
Dept: SURGERY | Facility: CLINIC | Age: 71
End: 2021-04-13
Payer: MEDICARE

## 2021-04-13 VITALS
WEIGHT: 190 LBS | TEMPERATURE: 97.3 F | OXYGEN SATURATION: 97 % | HEART RATE: 87 BPM | DIASTOLIC BLOOD PRESSURE: 70 MMHG | SYSTOLIC BLOOD PRESSURE: 105 MMHG | HEIGHT: 69 IN | BODY MASS INDEX: 28.14 KG/M2

## 2021-04-13 DIAGNOSIS — K21.9 GASTRO-ESOPHAGEAL REFLUX DISEASE W/OUT ESOPHAGITIS: ICD-10-CM

## 2021-04-13 DIAGNOSIS — K81.0 ACUTE CHOLECYSTITIS: ICD-10-CM

## 2021-04-13 DIAGNOSIS — Z78.9 OTHER SPECIFIED HEALTH STATUS: ICD-10-CM

## 2021-04-13 DIAGNOSIS — Z87.891 PERSONAL HISTORY OF NICOTINE DEPENDENCE: ICD-10-CM

## 2021-04-13 DIAGNOSIS — Z86.79 PERSONAL HISTORY OF OTHER DISEASES OF THE CIRCULATORY SYSTEM: ICD-10-CM

## 2021-04-13 DIAGNOSIS — Z86.39 PERSONAL HISTORY OF OTHER ENDOCRINE, NUTRITIONAL AND METABOLIC DISEASE: ICD-10-CM

## 2021-04-13 DIAGNOSIS — Z87.438 PERSONAL HISTORY OF OTHER DISEASES OF MALE GENITAL ORGANS: ICD-10-CM

## 2021-04-13 PROCEDURE — 99024 POSTOP FOLLOW-UP VISIT: CPT

## 2021-04-13 NOTE — HISTORY OF PRESENT ILLNESS
[de-identified] : Mr Caraballo presented to the [facility] emergency department on April 2, 2021, with RUQ abdominal pain.  He was found to have an elevated WBC of 15.68 with a left shift of 88.8% neutrophils.  A RUQ ultrasound and CT renal stone hunt were performed, which demonstrated cholelithiasis with gallbladder distension, suspicious for acute cholecystitis.  He was transferred to [facility] for further management.  He underwent a robotic-assisted cholecystectomy on April 3, 2021.  He was discharged home on April 5, 2021. [de-identified] : He presented today for a routine post-operative visit.  He is overall feeling well.  He denied fever, chills, nausea, vomiting, diarrhea, or constipation.

## 2021-04-13 NOTE — REASON FOR VISIT
[Post Op: _________] : a [unfilled] post op visit [FreeTextEntry1] : He underwent a robotic-assisted cholecystectomy on April 3, 2021.

## 2021-04-13 NOTE — DATA REVIEWED
[FreeTextEntry1] : Pathology (4/3/2021) - acute, focally gangrenous cholecystitis with cholelithiasis.

## 2022-01-19 NOTE — ASSESSMENT
[facility]  [place]    SUBJECTIVE    CC: Skip Conner is an 62 year old male who presents for preventative health visit.     Patient has been advised of split billing requirements and indicates understanding: Yes  Healthy Habits:     Getting at least 3 servings of Calcium per day:  Yes    Bi-annual eye exam:  Yes    Dental care twice a year:  Yes    Sleep apnea or symptoms of sleep apnea:  None    Diet:  Low salt    Frequency of exercise:  1 day/week    Duration of exercise:  Less than 15 minutes    Taking medications regularly:  Yes    Medication side effects:  None    PHQ-2 Total Score: 0    Additional concerns today:  Yes    Today's PHQ-2 Score:   PHQ-2 ( 1999 Pfizer) 1/19/2022   Q1: Little interest or pleasure in doing things 0   Q2: Feeling down, depressed or hopeless 0   PHQ-2 Score 0   PHQ-2 Total Score (12-17 Years)- Positive if 3 or more points; Administer PHQ-A if positive -   Q1: Little interest or pleasure in doing things Not at all   Q2: Feeling down, depressed or hopeless Not at all   PHQ-2 Score 0       Abuse: Current or Past(Physical, Sexual or Emotional)- NO  Do you feel safe in your environment? YES    Social History     Tobacco Use     Smoking status: Never Smoker     Smokeless tobacco: Never Used   Substance Use Topics     Alcohol use: Yes     Alcohol/week: 7.0 standard drinks     Types: 7 Standard drinks or equivalent per week     Comment: 1 per day     If you drink alcohol do you typically have >3 drinks per day or >7 drinks per week? Yes      Alcohol Use 1/19/2022   Prescreen: >3 drinks/day or >7 drinks/week? Yes   Prescreen: >3 drinks/day or >7 drinks/week? -   AUDIT SCORE  3     AUDIT - Alcohol Use Disorders Identification Test - Reproduced from the World Health Organization Audit 2001 (Second Edition) 1/19/2022   1.  How often do you have a drink containing alcohol? 2 to 3 times a week   2.  How many drinks containing alcohol do you have on a typical day when you are drinking? 1  [FreeTextEntry1] : Mr. Caraballo is a 70-year-old man who underwent a robotic-assisted cholecystectomy on April 3, 2021.  He is recovering as expected and will follow up with me as needed. or 2   3.  How often do you have five or more drinks on one occasion? Never   4.  How often during the last year have you found that you were not able to stop drinking once you had started? Never   5.  How often during the last year have you failed to do what was normally expected of you because of drinking? Never   6.  How often during the last year have you needed a first drink in the morning to get yourself going after a heavy drinking session? Never   7.  How often during the last year have you had a feeling of guilt or remorse after drinking? Never   8.  How often during the last year have you been unable to remember what happened the night before because of your drinking? Never   9.  Have you or someone else been injured because of your drinking? No   10. Has a relative, friend, doctor or other health care worker been concerned about your drinking or suggested you cut down? No   TOTAL SCORE 3       Last PSA:   PSA   Date Value Ref Range Status   11/19/2020 0.49 0 - 4 ug/L Final     Comment:     Assay Method:  Chemiluminescence using Siemens Vista analyzer     Reviewed orders with patient. Reviewed health maintenance and updated orders accordingly - Yes    Hypertension Follow-up      Do you check your blood pressure regularly outside of the clinic? Yes     Are you following a low salt diet? Yes    Are your blood pressures ever more than 140 on the top number (systolic) OR more   than 90 on the bottom number (diastolic), for example 140/90? Yes    BP Readings from Last 6 Encounters:   01/19/22 (!) 154/92   01/08/21 (!) 160/102   11/18/20 (!) 142/90   11/04/20 (!) 148/94     Lipids    Recent Labs   Lab Test 11/19/20  0813 10/28/19  0000   CHOL 259* 250*   HDL 66 63   * 148*   TRIG 64 195     Gout - no recent flares - allopurinol    RLS - nearly every night at 7-8 pm - requip    Health Maintenance     Colonoscopy:  Due 1/2024   FIT:  given              PSA:  pending   DEXA:  NA    There are no preventive  care reminders to display for this patient.    Current Problem List    Patient Active Problem List   Diagnosis     Hypertension goal BP (blood pressure) < 140/90     Hyperlipidemia LDL goal <130     Idiopathic chronic gout of multiple sites without tophus     Restless legs syndrome (RLS)     History of colonic polyps       Past Medical History    Past Medical History:   Diagnosis Date     History of colonic polyps 01/2019     Hyperlipidemia LDL goal <130      Hypertension goal BP (blood pressure) < 140/90      Idiopathic chronic gout of multiple sites without tophus      Restless legs syndrome (RLS)        Past Surgical History    Past Surgical History:   Procedure Laterality Date     APPENDECTOMY  2008     COLONOSCOPY  01/2019    colon polyp, due 5 yrs       Current Medications    Current Outpatient Medications   Medication Sig Dispense Refill     allopurinol (ZYLOPRIM) 300 MG tablet Take 1 tablet (300 mg) by mouth daily 90 tablet 3     amLODIPine (NORVASC) 5 MG tablet Take 1 tablet (5 mg) by mouth daily 90 tablet 3     lisinopril (ZESTRIL) 20 MG tablet Take 1 tablet (20 mg) by mouth daily 90 tablet 3     rOPINIRole (REQUIP) 3 MG tablet Take 1 tablet (3 mg) by mouth daily 90 tablet 3     diclofenac (VOLTAREN) 1 % topical gel Apply 4 g topically 4 times daily 100 g 3       Allergies    No Known Allergies    Immunizations    Immunization History   Administered Date(s) Administered     COVID-19,PF,Pfizer (12+ Yrs) 04/01/2021, 04/27/2021, 01/19/2022     DTaP, Unspecified 01/30/2009     HepA-Adult 01/01/2000, 06/15/2000     HepB-Adult 01/01/1995, 03/01/1995, 06/15/1995     Influenza (IIV3) PF 10/03/2013     Influenza Quad, Recombinant, pf(RIV4) (Flublok) 10/28/2019, 11/04/2020, 01/19/2022     Influenza Vaccine IM > 6 months Valent IIV4 (Alfuria,Fluzone) 10/14/2014, 10/28/2019     Pneumococcal 23 valent 11/04/2020     Td (Adult), Adsorbed 10/28/2019     Tdap (Adult) Unspecified Formulation 01/30/2009     Zoster vaccine  recombinant adjuvanted (SHINGRIX) 11/19/2020, 01/20/2021       Family History    Family History   Problem Relation Age of Onset     Arthritis Mother      Heart Disease Father      Hypertension Father        Social History    Social History     Socioeconomic History     Marital status:      Spouse name: Not on file     Number of children: 2     Years of education: 14     Highest education level: Not on file   Occupational History     Not on file   Tobacco Use     Smoking status: Never Smoker     Smokeless tobacco: Never Used   Vaping Use     Vaping Use: Never used   Substance and Sexual Activity     Alcohol use: Yes     Alcohol/week: 7.0 standard drinks     Types: 7 Standard drinks or equivalent per week     Comment: 1 per day     Drug use: Never     Sexual activity: Yes   Other Topics Concern     Not on file   Social History Narrative     Not on file     Social Determinants of Health     Financial Resource Strain: Not on file   Food Insecurity: Not on file   Transportation Needs: Not on file   Physical Activity: Not on file   Stress: Not on file   Social Connections: Not on file   Intimate Partner Violence: Not on file   Housing Stability: Not on file       ROS    CONSTITUTIONAL: NEGATIVE for fever, chills, change in weight  INTEGUMENTARY/SKIN: NEGATIVE for worrisome rashes, moles or lesions  EYES: NEGATIVE for vision changes or irritation  ENT/MOUTH: NEGATIVE for ear, mouth and throat problems  RESP: NEGATIVE for significant cough or SOB  CV: NEGATIVE for chest pain, palpitations or peripheral edema  GI: NEGATIVE for nausea, abdominal pain, heartburn, or change in bowel habits  : NEGATIVE for frequency, dysuria, or hematuria  MUSCULOSKELETAL: NEGATIVE for significant arthralgias or myalgia  NEURO: NEGATIVE for weakness, dizziness or paresthesias  ENDOCRINE: NEGATIVE for temperature intolerance, skin/hair changes  HEME: NEGATIVE for bleeding problems  PSYCHIATRIC: NEGATIVE for changes in mood or  "affect    OBJECTIVE    BP (!) 154/92   Pulse 79   Temp 98.4  F (36.9  C) (Tympanic)   Resp 14   Ht 1.88 m (6' 2\")   Wt 114.8 kg (253 lb)   SpO2 99%   BMI 32.48 kg/m      EXAM:    GENERAL: healthy, alert and no distress  EYES: Eyes grossly normal to inspection, PERRL and conjunctivae and sclerae normal  HENT: ear canals and TM's normal, nose and mouth without ulcers or lesions  NECK: no adenopathy, no asymmetry, masses, or scars and thyroid normal to palpation  RESP: lungs clear to auscultation - no rales, rhonchi or wheezes  CV: regular rate and rhythm, normal S1 S2, no S3 or S4, no murmur, click or rub, no peripheral edema and peripheral pulses strong  ABDOMEN: soft, nontender, no hepatosplenomegaly, no masses and bowel sounds normal   (male): testicles normal without atrophy or masses, no hernias and penis normal without urethral discharge  RECTAL: normal sphincter tone, no rectal masses and prostate of normal size for age, smooth, nontender without masses/nodules  MS: no gross musculoskeletal defects noted, no edema  SKIN: no suspicious lesions or rashes  NEURO: Normal strength and tone, mentation intact and speech normal  PSYCH: mentation appears normal, affect normal/bright  LYMPH: no cervical, supraclavicular, axillary, or inguinal adenopathy    DIAGNOSTICS/PROCEDURES    Pending    ASSESSMENT      ICD-10-CM    1. Routine general medical examination at a health care facility  Z00.00 Comprehensive metabolic panel     Lipid panel reflex to direct LDL Fasting     CBC with platelets     CK total     UA reflex to Microscopic and Culture     Albumin Random Urine Quantitative with Creat Ratio     TSH with free T4 reflex     Prostate Specific Antigen Screen     Fecal colorectal cancer screen FIT     Uric acid     Ferritin     INFLUENZA QUAD, RECOMBINANT, P-FREE (RIV4) (FLUBLOK)     REVIEW OF HEALTH MAINTENANCE PROTOCOL ORDERS     Comprehensive metabolic panel     Lipid panel reflex to direct LDL Fasting     " CBC with platelets     CK total     TSH with free T4 reflex     Prostate Specific Antigen Screen     Fecal colorectal cancer screen FIT     Uric acid     Ferritin     UA reflex to Microscopic and Culture     Albumin Random Urine Quantitative with Creat Ratio     Urine Microscopic   2. Hypertension goal BP (blood pressure) < 140/90  I10 Comprehensive metabolic panel     UA reflex to Microscopic and Culture     Albumin Random Urine Quantitative with Creat Ratio     REVIEW OF HEALTH MAINTENANCE PROTOCOL ORDERS     lisinopril (ZESTRIL) 20 MG tablet     amLODIPine (NORVASC) 5 MG tablet     Comprehensive metabolic panel     UA reflex to Microscopic and Culture     Albumin Random Urine Quantitative with Creat Ratio     Urine Microscopic   3. Hyperlipidemia LDL goal <130  E78.5 Comprehensive metabolic panel     Lipid panel reflex to direct LDL Fasting     CK total     REVIEW OF HEALTH MAINTENANCE PROTOCOL ORDERS     Comprehensive metabolic panel     Lipid panel reflex to direct LDL Fasting     CK total   4. Idiopathic chronic gout of multiple sites without tophus  M1A.09X0 Uric acid     REVIEW OF HEALTH MAINTENANCE PROTOCOL ORDERS     allopurinol (ZYLOPRIM) 300 MG tablet     Uric acid   5. Restless legs syndrome (RLS)  G25.81 Ferritin     REVIEW OF HEALTH MAINTENANCE PROTOCOL ORDERS     rOPINIRole (REQUIP) 3 MG tablet     Ferritin   6. History of colonic polyps  Z86.010 Fecal colorectal cancer screen FIT     REVIEW OF HEALTH MAINTENANCE PROTOCOL ORDERS     Fecal colorectal cancer screen FIT   7. Screen for colon cancer  Z12.11 Fecal colorectal cancer screen FIT     REVIEW OF HEALTH MAINTENANCE PROTOCOL ORDERS     Fecal colorectal cancer screen FIT   8. Screening for prostate cancer  Z12.5 Prostate Specific Antigen Screen     REVIEW OF HEALTH MAINTENANCE PROTOCOL ORDERS     Prostate Specific Antigen Screen   9. Medication monitoring encounter  Z51.81 Comprehensive metabolic panel     Lipid panel reflex to direct LDL Fasting      CBC with platelets     CK total     UA reflex to Microscopic and Culture     Albumin Random Urine Quantitative with Creat Ratio     TSH with free T4 reflex     Uric acid     Ferritin     REVIEW OF HEALTH MAINTENANCE PROTOCOL ORDERS     Comprehensive metabolic panel     Lipid panel reflex to direct LDL Fasting     CBC with platelets     CK total     TSH with free T4 reflex     Uric acid     Ferritin     UA reflex to Microscopic and Culture     Albumin Random Urine Quantitative with Creat Ratio     Urine Microscopic   10. Need for influenza vaccination  Z23 INFLUENZA QUAD, RECOMBINANT, P-FREE (RIV4) (FLUBLOK)     REVIEW OF HEALTH MAINTENANCE PROTOCOL ORDERS   11. High priority for 2019-nCoV vaccine  Z23 REVIEW OF HEALTH MAINTENANCE PROTOCOL ORDERS     COVID-19,PF,PFIZER (12+ Yrs GRAY LABEL)       PLAN    Discussed treatment/modality options, including risk and benefits, he desires:    advised alcohol consumption 1oz per day or less, advised aspirin 81 mg po daily, advised 1 multivitamin per day, advised calcium 8265-5043 mg/d and Vitamin D 800-1200 IU/d, advised dentist every 6 months, advised diet, exercise, and weight loss, advised opthalmologist every 1-2 years, advised self testicular exam q month, further health care maintenance, further lab(s), immunization(s), medication refill(s) and observation    Discussed controversies surrounding PSA. Specifically reviewed 2017 USPSTF findings recommending discussion of PSA testing for men ages 55-69.  Reviewed findings of the  Randomized Study of Screening for Prostate Cancer which showed a 30% reduction in advanced stage prostate cancer and a 20% reduction in death rate from prostate cancer in this age group. PSA-based screening may prevent up to 2 deaths and up to 3 cases of metastatic disease per 1,000 men screened over 13 years.    We've elected to do PSA this year after discussing these controversies.    All diagnosis above reviewed and noted above,  "otherwise stable.      See [facility] orders for further details.      1) med refills, add amlodipine, close follow up BP    2) labs pending    3) immunization reviewed, flu/COVID given    Return in about 1 month (around 2/19/2022).    There are no preventive care reminders to display for this patient.    COUNSELING    Reviewed preventive health counseling, as reflected in patient instructions    BP Readings from Last 1 Encounters:   01/19/22 (!) 154/92     Estimated body mass index is 32.48 kg/m  as calculated from the following:    Height as of this encounter: 1.88 m (6' 2\").    Weight as of this encounter: 114.8 kg (253 lb).    Weight management plan: diet and exercise     reports that he has never smoked. He has never used smokeless tobacco.      Counseling Resources:    ATP IV Guidelines  Pooled Cohorts Equation Calculator  FRAX Risk Assessment  ICSI Preventive Guidelines  Dietary Guidelines for Americans, 2010  [employer] MyPlate  ASA Prophylaxis  Lung CA Screening           Kal Briceño MD, FAAFP     [facility]  [address] [place] [address]  [email or web address]   Office: [phone]  Fax: [phone]  Pager: [phone]     Answers for HPI/ROS submitted by the patient on 1/19/2022  abdominal pain: No  Blood in stool: No  Blood in urine: No  chest pain: No  chills: No  congestion: No  constipation: No  cough: No  diarrhea: No  dizziness: No  ear pain: No  eye pain: No  nervous/anxious: No  fever: No  frequency: No  genital sores: No  headaches: No  hearing loss: No  heartburn: No  arthralgias: No  joint swelling: No  peripheral edema: No  mood changes: No  myalgias: No  nausea: No  dysuria: No  palpitations: No  Skin sensation changes: No  sore throat: No  urgency: No  rash: No  shortness of breath: No  visual disturbance: No  weakness: No  impotence: No  penile discharge: No      "